# Patient Record
Sex: FEMALE | URBAN - METROPOLITAN AREA
[De-identification: names, ages, dates, MRNs, and addresses within clinical notes are randomized per-mention and may not be internally consistent; named-entity substitution may affect disease eponyms.]

---

## 2020-12-01 ENCOUNTER — OFFICE VISIT (OUTPATIENT)
Dept: URBAN - METROPOLITAN AREA CLINIC 36 | Facility: CLINIC | Age: 75
End: 2020-12-01
Payer: MEDICARE

## 2020-12-01 DIAGNOSIS — H25.13 AGE-RELATED NUCLEAR CATARACT, BILATERAL: ICD-10-CM

## 2020-12-01 PROCEDURE — 92134 CPTRZ OPH DX IMG PST SGM RTA: CPT | Performed by: OPHTHALMOLOGY

## 2020-12-01 PROCEDURE — 99204 OFFICE O/P NEW MOD 45 MIN: CPT | Performed by: OPHTHALMOLOGY

## 2020-12-01 ASSESSMENT — INTRAOCULAR PRESSURE
OD: 17
OS: 19

## 2020-12-01 NOTE — IMPRESSION/PLAN
Impression: Puckering of macula, left eye: H35.372. Plan: Exam and OCT demonstrate a Macular Pucker. The diagnosis, natural history, and prognosis of ERMs, as well as the risks and benefits of PPV/MP versus observation were discussed at length. Given the patient's current visual acuity and minimal hindrance on activities of daily living, observation was recommended at this time.

## 2020-12-01 NOTE — IMPRESSION/PLAN
Impression: Central retinal artery occlusion, right eye: H34.11. Plan: Patient presents with 3 wks of vision loss. Exam and OCT demonstrate decreased perfusion and inner-retinal edema indicating the presence of a central artery occlusion (CRAO). Fortunately, the patient denies any GCA symptoms at this time. It is recommended that the patient see PCP emergently to undergo a careful medical evaluation to include blood pressure assessment, carotid doppler evaluation, CBC with differential, CRP, ESR, and possible cardiac echocardiogram.  Discussed that no ocular intervention has been proven to improve the clinical outcome of the eye. However, urgent risk- factor mitigation is important to reduce risk of future CVA. The patient expressed understanding. Will continue to follow closely for development of neovascularization.  

RTC 4-6 wks with OCT OU, Optos FA OD> OS

## 2020-12-01 NOTE — IMPRESSION/PLAN
Impression: Age-related nuclear cataract, bilateral: H25.13. Plan: Followed by Dr. Lizbet Carson. Observe for now until CRAO is fully assessed.

## 2020-12-28 ENCOUNTER — OFFICE VISIT (OUTPATIENT)
Dept: URBAN - METROPOLITAN AREA CLINIC 13 | Facility: CLINIC | Age: 75
End: 2020-12-28
Payer: MEDICARE

## 2020-12-28 DIAGNOSIS — H35.372 PUCKERING OF MACULA, LEFT EYE: ICD-10-CM

## 2020-12-28 DIAGNOSIS — H34.11 CENTRAL RETINAL ARTERY OCCLUSION, RIGHT EYE: Primary | ICD-10-CM

## 2020-12-28 DIAGNOSIS — H40.89 OTHER SPECIFIED GLAUCOMA: ICD-10-CM

## 2020-12-28 PROCEDURE — 65810 DRAINAGE OF EYE: CPT | Performed by: OPHTHALMOLOGY

## 2020-12-28 PROCEDURE — 92134 CPTRZ OPH DX IMG PST SGM RTA: CPT | Performed by: OPHTHALMOLOGY

## 2020-12-28 PROCEDURE — 67028 INJECTION EYE DRUG: CPT | Performed by: OPHTHALMOLOGY

## 2020-12-28 ASSESSMENT — INTRAOCULAR PRESSURE
OS: 14
OD: 37

## 2020-12-28 NOTE — IMPRESSION/PLAN
Impression: Central retinal artery occlusion, right eye: H34.11.
OCT OU= no view OD, ERM OS  Plan: Developed new symptoms on 12/12/20, consistent with hyphema. Developed pain c/w high IOP on 12/24/20 leading to ER visit. Seen today by Dr. Eladio Arroyo with NVI and hyphema. No view for PRP today. Would rec med management with Avastin and AC tap. Hold off on PRP til better view. If hyphema worsens and she may benefit from [facility] washout, could consider PPV/EL at the time. IOP today still high on combigan and dorzolamide (started on drops on 12/24 from ER) Has diamox called in to pharmacy -- agree Discussed R,B,A of Avastin vs Lucentis vs Eylea vs Beovu injection. Discussed no FDA approval with Avastin and compounding risk. Discussed signs and symptoms of inflammation, endophthalmitis, vitreous hemorrhage, retinal tear, retinal detachment. Patient understands and wishes to proceed with Avastin injection today. Timeout was performed before procedure. AVASTIN INJECTION COMPLETED TODAY as per protocol without complications. Post tap IOP = 25mm Hg by tonopen 1 week DE OD with SI
refer to glaucoma as well

## 2020-12-28 NOTE — IMPRESSION/PLAN
Impression: Age-related nuclear cataract, bilateral: H25.13. Plan: Followed by Dr. Sal Hernandez. Observe for now until CRAO is fully assessed.

## 2021-01-04 ENCOUNTER — OFFICE VISIT (OUTPATIENT)
Dept: URBAN - METROPOLITAN AREA CLINIC 13 | Facility: CLINIC | Age: 76
End: 2021-01-04
Payer: MEDICARE

## 2021-01-04 PROCEDURE — 99024 POSTOP FOLLOW-UP VISIT: CPT | Performed by: OPHTHALMOLOGY

## 2021-01-04 PROCEDURE — 99213 OFFICE O/P EST LOW 20 MIN: CPT | Performed by: OPHTHALMOLOGY

## 2021-01-04 ASSESSMENT — INTRAOCULAR PRESSURE
OS: 16
OD: 28

## 2021-01-04 NOTE — IMPRESSION/PLAN
Impression: Central retinal artery occlusion, right eye: H34.11.
OCT OU= no view OD, ERM OS
s/p AV OD 12/28/20 Plan: Developed new symptoms on 12/12/20, consistent with hyphema. Developed pain c/w high IOP on 12/24/20 leading to ER visit. s/p Avastin 12/28/20. Improved IOP and hyphema. No view for PRP today. If hyphema worsens and she may benefit from [facility] washout, could consider PPV/EL at the time. IOP improved on combigan and dorzolamide. Will call in brimonidine and timolol to replace [place] (almost out). Continue diamox. Refer to glaucoma. 3-4 weeks OCT OU re-eval Avastin OD

## 2021-01-04 NOTE — IMPRESSION/PLAN
Impression: Age-related nuclear cataract, bilateral: H25.13. Plan: Followed by Dr. Heena Zelaya. Observe for now until CRAO is fully assessed.

## 2021-02-01 ENCOUNTER — OFFICE VISIT (OUTPATIENT)
Dept: URBAN - METROPOLITAN AREA CLINIC 13 | Facility: CLINIC | Age: 76
End: 2021-02-01
Payer: MEDICARE

## 2021-02-01 PROCEDURE — 92134 CPTRZ OPH DX IMG PST SGM RTA: CPT | Performed by: OPHTHALMOLOGY

## 2021-02-01 PROCEDURE — 67228 TREATMENT X10SV RETINOPATHY: CPT | Performed by: OPHTHALMOLOGY

## 2021-02-01 PROCEDURE — 92235 FLUORESCEIN ANGRPH MLTIFRAME: CPT | Performed by: OPHTHALMOLOGY

## 2021-02-01 PROCEDURE — 99214 OFFICE O/P EST MOD 30 MIN: CPT | Performed by: OPHTHALMOLOGY

## 2021-02-01 ASSESSMENT — INTRAOCULAR PRESSURE
OS: 27
OS: 24
OD: 51

## 2021-02-01 NOTE — IMPRESSION/PLAN
Impression: Central retinal artery occlusion, right eye: H34.11.
OCT OU= no view OD, ERM /304
s/p AV OD 12/28/20 Plan: Developed new symptoms on 12/12/20, consistent with hyphema. Developed pain c/w high IOP on 12/24/20 leading to ER visit. s/p Avastin 12/28/20. Had improved IOP and hyphema on brimonidine, timolol, and diamox. Hyphema resolving, no apparent NVI. Currently only on brimonidine BID. Had stopped timolol and had nausea with diamox. Has appointment to see Dr. Miguel Recio on 4/1/2021. Start brimonidine/timolol/dorzolamide TID OD. Check Optos FA OD. View improved for PRP. Rec PRP today. 1 week IOP check and Avastin OD. Will call Dr. Yessenia Ruiz office to ask for earlier appointment.

## 2021-02-01 NOTE — IMPRESSION/PLAN
Impression: Age-related nuclear cataract, bilateral: H25.13. Plan: Followed by Dr. Monster Lal. Observe for now until CRAO is fully assessed.

## 2021-02-09 ENCOUNTER — PROCEDURE (OUTPATIENT)
Dept: URBAN - METROPOLITAN AREA CLINIC 13 | Facility: CLINIC | Age: 76
End: 2021-02-09
Payer: MEDICARE

## 2021-02-09 PROCEDURE — 67028 INJECTION EYE DRUG: CPT | Performed by: OPHTHALMOLOGY

## 2021-02-09 RX ORDER — TIMOLOL MALEATE 5 MG/ML
0.5 % SOLUTION/ DROPS OPHTHALMIC
Qty: 5 | Refills: 0 | Status: ACTIVE
Start: 2021-02-01

## 2021-02-09 ASSESSMENT — INTRAOCULAR PRESSURE
OS: 17
OD: 23

## 2023-06-13 ENCOUNTER — OFFICE VISIT (OUTPATIENT)
Dept: URBAN - METROPOLITAN AREA CLINIC 13 | Facility: CLINIC | Age: 78
End: 2023-06-13
Payer: MEDICARE

## 2023-06-13 DIAGNOSIS — H17.9 CORNEAL SCAR: ICD-10-CM

## 2023-06-13 DIAGNOSIS — Z96.1 PRESENCE OF INTRAOCULAR LENS: ICD-10-CM

## 2023-06-13 DIAGNOSIS — H34.11 CENTRAL RETINAL ARTERY OCCLUSION, RIGHT EYE: Primary | ICD-10-CM

## 2023-06-13 DIAGNOSIS — H40.89 OTHER SPECIFIED GLAUCOMA: ICD-10-CM

## 2023-06-13 DIAGNOSIS — H35.372 PUCKERING OF MACULA, LEFT EYE: ICD-10-CM

## 2023-06-13 DIAGNOSIS — H25.11 AGE-RELATED NUCLEAR CATARACT, RIGHT EYE: ICD-10-CM

## 2023-06-13 PROCEDURE — 92134 CPTRZ OPH DX IMG PST SGM RTA: CPT | Performed by: OPHTHALMOLOGY

## 2023-06-13 PROCEDURE — 99214 OFFICE O/P EST MOD 30 MIN: CPT | Performed by: OPHTHALMOLOGY

## 2023-06-13 ASSESSMENT — INTRAOCULAR PRESSURE
OD: 23
OS: 15

## 2023-06-13 NOTE — IMPRESSION/PLAN
Impression: Central retinal artery occlusion, right eye: H34.11.
OCT OU= no view OD, ERM OS  
s/p Avastin OD - 02/09/2021
s/p PRP OD - 02/01/2021 Plan: NLP eye
h/o PRP
h/o diode laser with Dr. Gerard Louis in the past

now with some discomfort and per pt report had IOP at 30 recently
rec comfort care

currently on no drops OD
using brimonidine/lataprost OS

patient interested in seeing Dr. Gerard Louis for re-eval

12m OCT OU

## 2023-06-13 NOTE — IMPRESSION/PLAN
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 Plan: Band K Patient interested in exploring option to removal for cosmetic and discomfort. Will refer.

## 2024-08-15 ENCOUNTER — OFFICE VISIT (OUTPATIENT)
Dept: URBAN - METROPOLITAN AREA CLINIC 35 | Facility: CLINIC | Age: 79
End: 2024-08-15
Payer: MEDICARE

## 2024-08-15 DIAGNOSIS — H17.9 CORNEAL SCAR: ICD-10-CM

## 2024-08-15 DIAGNOSIS — H34.11 CENTRAL RETINAL ARTERY OCCLUSION, RIGHT EYE: ICD-10-CM

## 2024-08-15 DIAGNOSIS — H35.3122 NONEXUDATIVE AGE-RELATED MACULAR DEGENERATION, LEFT EYE, INTERMEDIATE DRY STAGE: Primary | ICD-10-CM

## 2024-08-15 DIAGNOSIS — H35.372 PUCKERING OF MACULA, LEFT EYE: ICD-10-CM

## 2024-08-15 PROCEDURE — 92134 CPTRZ OPH DX IMG PST SGM RTA: CPT | Performed by: OPHTHALMOLOGY

## 2024-08-15 PROCEDURE — 92014 COMPRE OPH EXAM EST PT 1/>: CPT | Performed by: OPHTHALMOLOGY

## 2024-08-15 ASSESSMENT — INTRAOCULAR PRESSURE
OS: 21
OD: 14